# Patient Record
Sex: MALE | Race: BLACK OR AFRICAN AMERICAN | NOT HISPANIC OR LATINO | URBAN - METROPOLITAN AREA
[De-identification: names, ages, dates, MRNs, and addresses within clinical notes are randomized per-mention and may not be internally consistent; named-entity substitution may affect disease eponyms.]

---

## 2021-08-03 ENCOUNTER — NEW REFERRAL (OUTPATIENT)
Dept: URBAN - METROPOLITAN AREA CLINIC 14 | Facility: CLINIC | Age: 61
End: 2021-08-03

## 2021-08-03 DIAGNOSIS — H25.13: ICD-10-CM

## 2021-08-03 DIAGNOSIS — H35.51: ICD-10-CM

## 2021-08-03 DIAGNOSIS — H43.812: ICD-10-CM

## 2021-08-03 DIAGNOSIS — H43.391: ICD-10-CM

## 2021-08-03 DIAGNOSIS — H35.413: ICD-10-CM

## 2021-08-03 PROCEDURE — 92134 CPTRZ OPH DX IMG PST SGM RTA: CPT

## 2021-08-03 PROCEDURE — 92201 OPSCPY EXTND RTA DRAW UNI/BI: CPT

## 2021-08-03 PROCEDURE — 99244 OFF/OP CNSLTJ NEW/EST MOD 40: CPT

## 2021-08-03 ASSESSMENT — TONOMETRY
OS_IOP_MMHG: 11
OD_IOP_MMHG: 12

## 2021-08-03 ASSESSMENT — VISUAL ACUITY
OD_CC: 20/20-1
OS_CC: 20/20-1

## 2021-09-14 ENCOUNTER — FOLLOW UP (OUTPATIENT)
Dept: URBAN - METROPOLITAN AREA CLINIC 14 | Facility: CLINIC | Age: 61
End: 2021-09-14

## 2021-09-14 DIAGNOSIS — H35.413: ICD-10-CM

## 2021-09-14 DIAGNOSIS — H43.391: ICD-10-CM

## 2021-09-14 DIAGNOSIS — H35.51: ICD-10-CM

## 2021-09-14 DIAGNOSIS — H43.812: ICD-10-CM

## 2021-09-14 PROCEDURE — 92014 COMPRE OPH EXAM EST PT 1/>: CPT

## 2021-09-14 PROCEDURE — 92134 CPTRZ OPH DX IMG PST SGM RTA: CPT

## 2021-09-14 PROCEDURE — 92201 OPSCPY EXTND RTA DRAW UNI/BI: CPT

## 2021-09-14 ASSESSMENT — TONOMETRY
OS_IOP_MMHG: 14
OD_IOP_MMHG: 14

## 2021-09-14 ASSESSMENT — VISUAL ACUITY
OD_CC: 20/25
OS_PH: 20/20
OD_PH: 20/20
OS_CC: 20/25

## 2024-02-02 ENCOUNTER — APPOINTMENT (EMERGENCY)
Dept: RADIOLOGY | Facility: HOSPITAL | Age: 64
End: 2024-02-02
Payer: COMMERCIAL

## 2024-02-02 ENCOUNTER — HOSPITAL ENCOUNTER (EMERGENCY)
Facility: HOSPITAL | Age: 64
Discharge: HOME | End: 2024-02-02
Attending: EMERGENCY MEDICINE
Payer: COMMERCIAL

## 2024-02-02 VITALS
OXYGEN SATURATION: 100 % | BODY MASS INDEX: 25.03 KG/M2 | DIASTOLIC BLOOD PRESSURE: 60 MMHG | TEMPERATURE: 98 F | HEART RATE: 70 BPM | RESPIRATION RATE: 18 BRPM | WEIGHT: 169 LBS | HEIGHT: 69 IN | SYSTOLIC BLOOD PRESSURE: 129 MMHG

## 2024-02-02 DIAGNOSIS — M54.41 ACUTE BILATERAL LOW BACK PAIN WITH BILATERAL SCIATICA: Primary | ICD-10-CM

## 2024-02-02 DIAGNOSIS — M54.42 ACUTE BILATERAL LOW BACK PAIN WITH BILATERAL SCIATICA: Primary | ICD-10-CM

## 2024-02-02 PROCEDURE — 96372 THER/PROPH/DIAG INJ SC/IM: CPT

## 2024-02-02 PROCEDURE — 99283 EMERGENCY DEPT VISIT LOW MDM: CPT

## 2024-02-02 PROCEDURE — 99284 EMERGENCY DEPT VISIT MOD MDM: CPT | Performed by: EMERGENCY MEDICINE

## 2024-02-02 PROCEDURE — 72100 X-RAY EXAM L-S SPINE 2/3 VWS: CPT

## 2024-02-02 RX ORDER — LIDOCAINE 50 MG/G
1 PATCH TOPICAL DAILY
Qty: 30 PATCH | Refills: 0 | Status: SHIPPED | OUTPATIENT
Start: 2024-02-02

## 2024-02-02 RX ORDER — CYCLOBENZAPRINE HCL 10 MG
10 TABLET ORAL 3 TIMES DAILY PRN
Qty: 15 TABLET | Refills: 0 | Status: SHIPPED | OUTPATIENT
Start: 2024-02-02

## 2024-02-02 RX ORDER — KETOROLAC TROMETHAMINE 30 MG/ML
15 INJECTION, SOLUTION INTRAMUSCULAR; INTRAVENOUS ONCE
Status: COMPLETED | OUTPATIENT
Start: 2024-02-02 | End: 2024-02-02

## 2024-02-02 RX ORDER — PREDNISONE 20 MG/1
40 TABLET ORAL DAILY
Qty: 10 TABLET | Refills: 0 | Status: SHIPPED | OUTPATIENT
Start: 2024-02-02 | End: 2024-02-07

## 2024-02-02 RX ORDER — NAPROXEN 500 MG/1
500 TABLET ORAL 2 TIMES DAILY WITH MEALS
Qty: 14 TABLET | Refills: 0 | Status: SHIPPED | OUTPATIENT
Start: 2024-02-02 | End: 2024-02-09

## 2024-02-02 RX ORDER — CYCLOBENZAPRINE HCL 10 MG
10 TABLET ORAL ONCE
Status: COMPLETED | OUTPATIENT
Start: 2024-02-02 | End: 2024-02-02

## 2024-02-02 RX ORDER — PREDNISONE 20 MG/1
60 TABLET ORAL ONCE
Status: COMPLETED | OUTPATIENT
Start: 2024-02-02 | End: 2024-02-02

## 2024-02-02 RX ORDER — LIDOCAINE 50 MG/G
1 PATCH TOPICAL ONCE
Status: DISCONTINUED | OUTPATIENT
Start: 2024-02-02 | End: 2024-02-02 | Stop reason: HOSPADM

## 2024-02-02 RX ADMIN — PREDNISONE 60 MG: 20 TABLET ORAL at 09:29

## 2024-02-02 RX ADMIN — LIDOCAINE 5% 1 PATCH: 700 PATCH TOPICAL at 09:34

## 2024-02-02 RX ADMIN — CYCLOBENZAPRINE HYDROCHLORIDE 10 MG: 10 TABLET, FILM COATED ORAL at 09:29

## 2024-02-02 RX ADMIN — KETOROLAC TROMETHAMINE 15 MG: 30 INJECTION, SOLUTION INTRAMUSCULAR; INTRAVENOUS at 09:31

## 2024-02-02 NOTE — ED PROVIDER NOTES
History  Chief Complaint   Patient presents with    Back Pain     Pt reports pain to lower back with pain radiating down both legs for 3-4 weeks and getting worse.       Patient presents for evaluation of lower back pain radiating down the back of both legs for the last 3 to 4 weeks.  Does not recall any injury or trauma.  States he sits a lot for work.  Denies any bowel or bladder dysfunction.      History provided by:  Patient   used: No    Back Pain  Associated symptoms: no numbness and no weakness        None       History reviewed. No pertinent past medical history.    History reviewed. No pertinent surgical history.    History reviewed. No pertinent family history.  I have reviewed and agree with the history as documented.    E-Cigarette/Vaping    E-Cigarette Use Never User      E-Cigarette/Vaping Substances     Social History     Tobacco Use    Smoking status: Never    Smokeless tobacco: Never   Vaping Use    Vaping status: Never Used   Substance Use Topics    Alcohol use: Never    Drug use: Never       Review of Systems   Musculoskeletal:  Positive for back pain.   Neurological:  Negative for weakness and numbness.   All other systems reviewed and are negative.      Physical Exam  Physical Exam  Vitals and nursing note reviewed.   Constitutional:       General: He is not in acute distress.  Cardiovascular:      Rate and Rhythm: Normal rate.      Pulses: Normal pulses.   Pulmonary:      Effort: Pulmonary effort is normal. No respiratory distress.   Abdominal:      Tenderness: There is no abdominal tenderness.   Musculoskeletal:         General: No deformity. Normal range of motion.   Skin:     Capillary Refill: Capillary refill takes less than 2 seconds.      Findings: No rash.   Neurological:      General: No focal deficit present.      Mental Status: He is alert and oriented to person, place, and time.      Sensory: No sensory deficit.      Motor: No weakness.      Gait: Gait normal.          Vital Signs  ED Triage Vitals   Temperature Pulse Respirations Blood Pressure SpO2   02/02/24 0853 02/02/24 0853 02/02/24 0853 02/02/24 0853 02/02/24 0853   98 °F (36.7 °C) 70 18 129/60 100 %      Temp Source Heart Rate Source Patient Position - Orthostatic VS BP Location FiO2 (%)   02/02/24 0853 02/02/24 0853 02/02/24 0853 02/02/24 0853 --   Tympanic Monitor Sitting Left arm       Pain Score       02/02/24 0845       8           Vitals:    02/02/24 0853   BP: 129/60   Pulse: 70   Patient Position - Orthostatic VS: Sitting         Visual Acuity      ED Medications  Medications   ketorolac (TORADOL) injection 15 mg (15 mg Intramuscular Given 2/2/24 0931)   predniSONE tablet 60 mg (60 mg Oral Given 2/2/24 0929)   cyclobenzaprine (FLEXERIL) tablet 10 mg (10 mg Oral Given 2/2/24 0929)       Diagnostic Studies  Results Reviewed       None                   XR lumbar spine 2 or 3 views   Final Result by Durga Mera MD (02/02 1028)      No acute osseous abnormality.      Degenerative changes as described.         Workstation performed: [id number]                    Procedures  Procedures         ED Course                               SBIRT 20yo+      Flowsheet Row Most Recent Value   Initial Alcohol Screen: US AUDIT-C     1. How often do you have a drink containing alcohol? 0 Filed at: 02/02/2024 0849   Audit-C Score 0 Filed at: 02/02/2024 0849                      Medical Decision Making  Pulse ox 100% on room air indicating adequate oxygenation.  Xray L-Spine: No fracture or dislocation as read by me    Amount and/or Complexity of Data Reviewed  Radiology: ordered and independent interpretation performed.    Risk  Prescription drug management.             Disposition  Final diagnoses:   Acute bilateral low back pain with bilateral sciatica     Time reflects when diagnosis was documented in both MDM as applicable and the Disposition within this note       Time User Action Codes Description Comment     2/2/2024  9:27 AM Doron Dias Add [M54.42,  M54.41] Acute bilateral low back pain with bilateral sciatica           ED Disposition       ED Disposition   Discharge    Condition   Stable    Date/Time   Fri Feb 2, 2024 0926    Comment   Calin Schultz discharge to home/self care.                   Follow-up Information       Follow up With Specialties Details Why Contact Info Additional Information    [facility] Emergency Department Emergency Medicine  If symptoms worsen [address] [facility] [address]  [phone] [facility], [address] [place], [place], [address]            Discharge Medication List as of 2/2/2024  9:30 AM        START taking these medications    Details   cyclobenzaprine (FLEXERIL) 10 mg tablet Take 1 tablet (10 mg total) by mouth 3 (three) times a day as needed for muscle spasms for up to 15 doses, Starting Fri 2/2/2024, Normal      lidocaine (Lidoderm) 5 % Apply 1 patch topically over 12 hours daily Remove & Discard patch within 12 hours or as directed by MD, Starting Fri 2/2/2024, Normal      naproxen (NAPROSYN) 500 mg tablet Take 1 tablet (500 mg total) by mouth 2 (two) times a day with meals for 7 days, Starting Fri 2/2/2024, Until Fri 2/9/2024, Normal      predniSONE 20 mg tablet Take 2 tablets (40 mg total) by mouth daily for 5 days, Starting Fri 2/2/2024, Until Wed 2/7/2024, Normal                 PDMP Review       None            ED Provider  Electronically Signed by             Doron Dias DO  02/02/24 1473

## 2024-02-05 ENCOUNTER — TELEPHONE (OUTPATIENT)
Dept: PHYSICAL THERAPY | Facility: OTHER | Age: 64
End: 2024-02-05

## 2024-02-05 ENCOUNTER — NURSE TRIAGE (OUTPATIENT)
Dept: PHYSICAL THERAPY | Facility: OTHER | Age: 64
End: 2024-02-05

## 2024-02-05 DIAGNOSIS — M54.41 ACUTE BILATERAL LOW BACK PAIN WITH BILATERAL SCIATICA: Primary | ICD-10-CM

## 2024-02-05 DIAGNOSIS — M54.42 ACUTE BILATERAL LOW BACK PAIN WITH BILATERAL SCIATICA: Primary | ICD-10-CM

## 2024-02-05 NOTE — TELEPHONE ENCOUNTER
"Additional Information   Negative: Is this related to a work injury?   Negative: Is this related to an MVA?   Negative: Are you currently recieving homecare services?    Background - Initial Assessment  Clinical complaint: ED visit on 02/02 due to \"mild\" Lower back pain and Sciatica pain. Hx of back pain 20-30 years ago. Patient states the sciatica pain started 3-4 weeks ago. It radiates from buttocks down to both legs, thighs, calf and feet, mainly on the right side. Numbness in right calf and foot, tingling in feet mostly right foot. Not seeing a Dr for this pain. NKI. Patient states pain is intermittent but persistent \"every day\". Worse when tries to get up from a sitting position, and starts walking. Patient described pain as shooting, throbbing, stabbing and spasm.  Date of onset:  3-4 weeks ago  Frequency of pain: intermittent and persistent.  Quality of pain: numb, shooting, stabbing, throbbing, and tingling.    Protocols used: [facility] Protocol    "

## 2024-02-05 NOTE — TELEPHONE ENCOUNTER
Call placed to the patient per [facility] referral.    V/M left for patient to call back. Phone number and hours of business provided.    This is the 1st attempt to reach the patient. Will defer referral per protocol.

## 2024-02-05 NOTE — TELEPHONE ENCOUNTER
Additional Information   Negative: Has the patient experienced major trauma? (fall from height, high speed collision, direct blow to spine) and is also experiencing nausea, light-headedness, or loss of consciousness?   Negative: Has the patient had unexplained weight loss?   Negative: Does the patient have a fever?   Negative: Is the patient experiencing urine retention?   Negative: Is the patient experiencing acute drop foot or paralysis?   Negative: Is the patient experiencing blood in sputum?   Negative: Is this a chronic condition?    Protocols used: [facility] Protocol    This RN did review in detail the [facility] and what we can provide for their back pain.  Patient is agreeable to being triaged by this RN and would like to proceed with Physical Therapy.    Referral was placed for Physical Therapy at the [facility]. Patients information was sent to the  to make evaluation appointment. Patient made aware that the PT office  will be calling to schedule the appointment.  Patient was provided with the phone number to the PT office.    No further questions and/or concerns were voiced by the patient at this time. Patient states understanding of the referral that was placed.    Referral Closed.

## 2024-02-08 ENCOUNTER — EVALUATION (OUTPATIENT)
Dept: PHYSICAL THERAPY | Facility: CLINIC | Age: 64
End: 2024-02-08
Payer: COMMERCIAL

## 2024-02-08 DIAGNOSIS — M54.42 ACUTE BILATERAL LOW BACK PAIN WITH BILATERAL SCIATICA: ICD-10-CM

## 2024-02-08 DIAGNOSIS — M54.41 ACUTE BILATERAL LOW BACK PAIN WITH BILATERAL SCIATICA: ICD-10-CM

## 2024-02-08 PROCEDURE — 97161 PT EVAL LOW COMPLEX 20 MIN: CPT | Performed by: PHYSICAL THERAPIST

## 2024-02-08 PROCEDURE — 97112 NEUROMUSCULAR REEDUCATION: CPT | Performed by: PHYSICAL THERAPIST

## 2024-02-08 NOTE — LETTER
2024      No Recipients    Patient: Calin Schultz  YOB: 1960   Date of Visit: 2024      Dear Dr. Leo:    One of your patients, Calin Schultz, was referred to me by the [facility].  Please see the evaluation summary attached below. Kindly provide your signature on the plan of care.    If you have any questions or concerns, please don't hesitate to call.      Sincerely,    Niru Ireland PT      Primary Care Provider:      I certify that I have read the below Plan of Care and certify the need for these services furnished under this plan of treatment while under my care.                    Brandyn Leo MD  [address]  [address]  [facility] [address]  Via Fax: [phone]           PT Evaluation     Today's date: 2024  Patient name: Calin Schultz  : 1960  MRN: [id number]  Referring provider: Niru Ireland PT  Dx:   Encounter Diagnosis     ICD-10-CM    1. Acute bilateral low back pain with bilateral sciatica  M54.42 Ambulatory referral to PT spine    M54.41                      Assessment  Assessment details: 2024: Pt presents with constant buttock/posterior proximal thigh pain and intermittent R posterior LE symptoms to his ankle. His signs and symptoms present for more than 16 days, are below the knee which are not increasing ordecreasing and are consistent with derangement which would benefit from patient education and directional specific mechanical correction. Symptoms respond with significant reduction and centralization upon mechanical assessment to determine directional preference of lumbar extension. Pt will benefit from skilled physical therapy 1-2X/week for 4-8 weeks to address deficits in range of motion, strength and function and return to PLOF by reaching short and long term goals. Pt was educated in spine anatomy, concept of peripheralization vs centralization, importance of HEP frequency and avoidance of  positions that peripheralize his pain.    Impairments: abnormal or restricted ROM, activity intolerance, lacks appropriate home exercise program, pain with function, poor posture  and poor body mechanics  Understanding of Dx/Px/POC: good   Prognosis: good    Goals  Short Tem Goals to be met in 4 weeks (target date 3/7/2024)  1. Pt to be independent w/ HEP  2. Reduce c/o pain to 0-5/10 with activity and prolonged positions; pain to become intermittent vs current constant.  3. Restore lumbar AROM to WFL.  4. Pt's pain to localize to L posterior thigh, buttock, and not radiate below that or spread to R LE.    Long Term Goal to be met in 8 weeks (target date 4/4/2024)  1. Pt to achieve full symptom prevention/resolution via HEP, reporting no remaining pain.  2. Pt to resume PLOF regarding ADL's and IADL's, achieving FOTO score of 69 or better.  3. Pt to achieve full lumbar ROM and LE hamstring flexibility w/o pain.  4. Pt to tolerate prolonged driving w/o c/o.      Plan  Planned modality interventions: traction, unattended electrical stimulation and thermotherapy: hydrocollator packs  Planned therapy interventions: joint mobilization, manual therapy, abdominal trunk stabilization, patient education, postural training, stretching, transfer training, home exercise program and strengthening  Frequency: 1-2x/week.  Plan of Care beginning date: 2/8/2024  Plan of Care expiration date: 4/4/2024  Treatment plan discussed with: patient        Subjective Evaluation    History of Present Illness  Mechanism of injury: Subjective 2/8/2024: pt woke one day 4 weeks ago with pain in both legs (posterior thigh to ankle) and numbness/tingling B/L calves. Initially pain was equal R vs L but recently the left is better than the right.   He did drive more than usual around the time of symptom onset.  Pt notes his legs get tired quickly now w/ driving, but also reports symptoms are worse w/ standing. Symptoms are constant in his gluts.   Pt  was prescribed prednisone at the ED which he is finishing today w/ some benefit.  Pt works as an . PMH significant for prior LBP, but not radicular symptoms. Pt has maintained prior HEP, describing bird dogs and wall squats (no mechanical correction/repeated lumbar movements)  Patient Goals  Patient goals for therapy: decreased pain, increased strength, return to sport/leisure activities and independence with ADLs/IADLs    Pain  At best pain rating: 3  At worst pain ratin  Quality: tingling; spasmotic, gnawing.  Progression: no change    Social Support    Employment status: working    Diagnostic Tests  X-ray: normal  Treatments  Previous treatment: medication  Current treatment: physical therapy        Objective    POSTURE:  2024 standing posture demonstrates normal lumbar lordosis; there is no lateral shift   Posture correction in stting reduces sympotoms; slumping increases c/o.    SPECIAL TESTS     2024   SLR supine:   (+ R/- L)  Crossed SLR:   (- R/+ L)  Prone instability test:  (+) kashif colin/guarded  Prone hip IR ROM:  (-R/-L)  Aberrant motion/X[place]'s: (+)  Supine to sit test:  (-)  Elizabeth test prone:  (-)  Slump test:   (+R/-L)  Femoral NTT:   (NT)      DERMATOMAL TESTIN/8/2024  L2 anterior thigh:  WNL B/L  L3 medial knee:  WNL B/L  L4 medial maleolus:  WNL B/L  L5 1st web space:  WNL B/L  S1 lateral/sole foot:  WNL B/L  S2 poster calf:  WNL B/L    MYOTOMAL TESTIN2024     Right  Left  L2-3 Hip flexion:  4+/5  4+/5  L3-4 Knee extension:  5/5  5/5  L5 Great toe exten:  5/5  5/5  L4 Heel walk/DF:  5/5  5/5  S1 toe walk/PF/ever:  5/5  5/5  S2 knee flex:   5/5  5/5    REFLEXES: 0= none; 1+ = slight; 2+ = brisk/normal; 3+= very brisk; 4+= clonus     2024  L3-4 Quadriceps:  0  L5-S1 Achilles:  0    SPINAL/PIAVM ASSESSMENT/PALPATION:   2024: Lumbar P to A segmental mobility kashif limited - guarded     LUMBAR AROM:   Flexion   Mod/kashif  "colin*  Extension  kashif colin* B/L glut fold  R sideglide  min colin  L sideglide  kashif colin* L LE    MECHANICAL ASSESSMENT:   2/8/2024: pre-test findings include 8/10 pain R LE post thigh/calf and L LE post thigh  ANEL 1 min x3 = cent to mid thigh/B  Repeated extension in lying (REIL) 3x5 = Centralizing to R glut fold/lat hip/B 4/10 (no L LE pain)      FUNCTION:  2/8/2024: Pt is limited with tolerance to driving w/ increased pain and LE \"weakness\"   Pain w/ lumbar AROM   Limited tolerance to prolonged standing and walking   Limited tolerance to bending/lifting   Inc pain w/ slumped sitting    FLEXIBILITY:  2/8/2024: Hamstring flexibility mod colin B/L 60 deg         Quad/hip flexor flexibitlity NT         Pirif flexibility kashif colin B/L          Precautions: History reviewed. No pertinent past medical history.   Access Code: [email or web address]  URL: [email or web address]  Date: 02/08/2024  Prepared by: Niru Ireland    Exercises  - Static Prone on Elbows  - 5 x daily - 3 reps - 1 minute hold  - Prone Press Up  - 5 x daily - 3 sets - 5 reps    SOC: 2/8/2024  FOTO: 2/8/2024  POC EXP:  DAILY TREATMENT LOG  date 2/8/2024       Visit #/auth 1       manual                        Neuro Re-Ed        ANEL  1'x3       REIL 3x5 (centralizing/B)       Sup sciatic NG        Sup fig 4 stretch                                Ther Ex                bridges        Fwd step ups w/ alt knee hike                                                Ther Activity                        Gait Training                        Modalities                             "

## 2024-02-08 NOTE — PROGRESS NOTES
PT Evaluation     Today's date: 2024  Patient name: Calin Schultz  : 1960  MRN: [id number]  Referring provider: Niru Ireland PT  Dx:   Encounter Diagnosis     ICD-10-CM    1. Acute bilateral low back pain with bilateral sciatica  M54.42 Ambulatory referral to PT spine    M54.41                      Assessment  Assessment details: 2024: Pt presents with constant buttock/posterior proximal thigh pain and intermittent R posterior LE symptoms to his ankle. His signs and symptoms present for more than 16 days, are below the knee which are not increasing ordecreasing and are consistent with derangement which would benefit from patient education and directional specific mechanical correction. Symptoms respond with significant reduction and centralization upon mechanical assessment to determine directional preference of lumbar extension. Pt will benefit from skilled physical therapy 1-2X/week for 4-8 weeks to address deficits in range of motion, strength and function and return to PLOF by reaching short and long term goals. Pt was educated in spine anatomy, concept of peripheralization vs centralization, importance of HEP frequency and avoidance of positions that peripheralize his pain.    Impairments: abnormal or restricted ROM, activity intolerance, lacks appropriate home exercise program, pain with function, poor posture  and poor body mechanics  Understanding of Dx/Px/POC: good   Prognosis: good    Goals  Short Tem Goals to be met in 4 weeks (target date 3/7/2024)  1. Pt to be independent w/ HEP  2. Reduce c/o pain to 0-5/10 with activity and prolonged positions; pain to become intermittent vs current constant.  3. Restore lumbar AROM to WFL.  4. Pt's pain to localize to L posterior thigh, buttock, and not radiate below that or spread to R LE.    Long Term Goal to be met in 8 weeks (target date 2024)  1. Pt to achieve full symptom prevention/resolution via HEP, reporting no remaining pain.  2.  Pt to resume PLOF regarding ADL's and IADL's, achieving FOTO score of 69 or better.  3. Pt to achieve full lumbar ROM and LE hamstring flexibility w/o pain.  4. Pt to tolerate prolonged driving w/o c/o.      Plan  Planned modality interventions: traction, unattended electrical stimulation and thermotherapy: hydrocollator packs  Planned therapy interventions: joint mobilization, manual therapy, abdominal trunk stabilization, patient education, postural training, stretching, transfer training, home exercise program and strengthening  Frequency: 1-2x/week.  Plan of Care beginning date: 2024  Plan of Care expiration date: 2024  Treatment plan discussed with: patient        Subjective Evaluation    History of Present Illness  Mechanism of injury: Subjective 2024: pt woke one day 4 weeks ago with pain in both legs (posterior thigh to ankle) and numbness/tingling B/L calves. Initially pain was equal R vs L but recently the left is better than the right.   He did drive more than usual around the time of symptom onset.  Pt notes his legs get tired quickly now w/ driving, but also reports symptoms are worse w/ standing. Symptoms are constant in his gluts.   Pt was prescribed prednisone at the ED which he is finishing today w/ some benefit.  Pt works as an . PMH significant for prior LBP, but not radicular symptoms. Pt has maintained prior HEP, describing bird dogs and wall squats (no mechanical correction/repeated lumbar movements)  Patient Goals  Patient goals for therapy: decreased pain, increased strength, return to sport/leisure activities and independence with ADLs/IADLs    Pain  At best pain rating: 3  At worst pain ratin  Quality: tingling; spasmotic, gnawing.  Progression: no change    Social Support    Employment status: working    Diagnostic Tests  X-ray: normal  Treatments  Previous treatment: medication  Current treatment: physical therapy        Objective    POSTURE:  2024 standing  "posture demonstrates normal lumbar lordosis; there is no lateral shift   Posture correction in stting reduces sympotoms; slumping increases c/o.    SPECIAL TESTS     2024   SLR supine:   (+ R/- L)  Crossed SLR:   (- R/+ L)  Prone instability test:  (+) kashif colin/guarded  Prone hip IR ROM:  (-R/-L)  Aberrant motion/[place]'s: (+)  Supine to sit test:  (-)  [place] test prone:  (-)  Slump test:   (+R/-L)  Femoral NTT:   (NT)      DERMATOMAL TESTIN/8/2024  L2 anterior thigh:  WNL B/L  L3 medial knee:  WNL B/L  L4 medial maleolus:  WNL B/L  L5 1st web space:  WNL B/L  S1 lateral/sole foot:  WNL B/L  S2 poster calf:  WNL B/L    MYOTOMAL TESTIN2024     Right  Left  L2-3 Hip flexion:  4+/5  4+/5  L3-4 Knee extension:  5/5  5/5  L5 Great toe exten:  5/5  5/5  L4 Heel walk/DF:  5/5  5/5  S1 toe walk/PF/ever:  5/5  5/5  S2 knee flex:   5/5  5/5    REFLEXES: 0= none; 1+ = slight; 2+ = brisk/normal; 3+= very brisk; 4+= clonus     2024  L3-4 Quadriceps:  0  L5-S1 Achilles:  0    SPINAL/PIAVM ASSESSMENT/PALPATION:   2024: Lumbar P to A segmental mobility kashif limited - guarded     LUMBAR AROM:   Flexion   Mod/kashif colin*  Extension  kashif colin* B/L glut fold  R sideglide  min colin  L sideglide  kashif colin* L LE    MECHANICAL ASSESSMENT:   2024: pre-test findings include 810 pain R LE post thigh/calf and L LE post thigh  ANEL 1 min x3 = cent to mid thigh/B  Repeated extension in lying (REIL) 3x5 = Centralizing to R glut fold/lat hip/B 4/10 (no L LE pain)      FUNCTION:  2024: Pt is limited with tolerance to driving w/ increased pain and LE \"weakness\"   Pain w/ lumbar AROM   Limited tolerance to prolonged standing and walking   Limited tolerance to bending/lifting   Inc pain w/ slumped sitting    FLEXIBILITY:  2024: Hamstring flexibility mod colin B/L 60 deg         Quad/hip flexor flexibitlity NT         Pirif flexibility kashif colin B/L          Precautions: History reviewed. No pertinent " past medical history.   Access Code: [email or web address]  URL: [email or web address]  Date: 02/08/2024  Prepared by: Niru Ireland    Exercises  - Static Prone on Elbows  - 5 x daily - 3 reps - 1 minute hold  - Prone Press Up  - 5 x daily - 3 sets - 5 reps    SOC: 2/8/2024  FOTO: 2/8/2024  POC EXP:  DAILY TREATMENT LOG  date 2/8/2024       Visit #/auth 1       manual                        Neuro Re-Ed        ANEL  1'x3       REIL 3x5 (centralizing/B)       Sup sciatic NG        Sup fig 4 stretch                                Ther Ex                bridges        Fwd step ups w/ alt knee hike                                                Ther Activity                        Gait Training                        Modalities

## 2024-02-12 ENCOUNTER — OFFICE VISIT (OUTPATIENT)
Dept: PHYSICAL THERAPY | Facility: CLINIC | Age: 64
End: 2024-02-12
Payer: COMMERCIAL

## 2024-02-12 DIAGNOSIS — M54.41 ACUTE BILATERAL LOW BACK PAIN WITH BILATERAL SCIATICA: Primary | ICD-10-CM

## 2024-02-12 DIAGNOSIS — M54.42 ACUTE BILATERAL LOW BACK PAIN WITH BILATERAL SCIATICA: Primary | ICD-10-CM

## 2024-02-12 PROCEDURE — 97112 NEUROMUSCULAR REEDUCATION: CPT | Performed by: PHYSICAL THERAPIST

## 2024-02-12 NOTE — PROGRESS NOTES
"Daily Note     Today's date: 2024  Patient name: Calin Schultz  : 1960  MRN: [id number]  Referring provider: Niru Ireland, PT  Dx:   Encounter Diagnosis     ICD-10-CM    1. Acute bilateral low back pain with bilateral sciatica  M54.42     M54.41                      Subjective: Pt reports 5/10 B/L glut fold and posterior thigh pain and R calf pain upon arrival. He had some \"spasms\" in R lateral lumbar area over the weekend.He did a lot of driving over the weekend.       Objective: See treatment diary below; pt arrives 15 min late, was accommodated.   ANEL 1'x2; then REIL 1x10 =     Assessment: Tolerated treatment well and continues to respond well to extension principle, but is seeing limited progress due to continued longer amounts of time spent in sitting. New HUSSEIN stretch and MH/estim reduce c/o muscle spasm . Patient would benefit from continued PT and updates to HEP made today.  ROM lumbar extension closer to end range w/ EIL from qped vs press ups.      Plan: Progress treatment as tolerated.         SOC: 2024  FOTO: 2024  POC EXP:  DAILY TREATMENT LOG  date 2024      Visit #/auth 1 2      manual                        Neuro Re-Ed  40'      ANEL  1'x3 1'x2      REIL 3x5 (centralizing/B) 1x10      EIL from qped  1x10; x2      Sup sciatic NG  1x10 R/L      Sup fig 4 stretch  20\"x3 R/L              Pt educ Periph vs centraliz; disc mechanics Reviewed disc mechanics; importance of avoidance of prolonged sitting; may need more reps of EIL if he sat a lot; try MH in prone      HeP Issued & reviewed Update & review      Ther Ex                bridges        Fwd step ups w/ alt knee hike                                                Ther Activity                        Gait Training                        Modalities        MH/estim prone  10' skin intact pre/post              Access Code: [email or web address]  URL: [email or web address]  Date: 2024  Prepared by: Niru " Shu    Exercises  - Static Prone on Elbows  - 5 x daily - 3 reps - 1 minute hold  - Quadruped Hip Drops  - 5 x daily - 10 reps  - Supine Hip External Rotation AAROM  - 2 x daily - 3 reps - 20 hold  - Supine Sciatic Nerve Glide  - 2 x daily - 10 reps

## 2024-02-15 ENCOUNTER — OFFICE VISIT (OUTPATIENT)
Dept: PHYSICAL THERAPY | Facility: CLINIC | Age: 64
End: 2024-02-15
Payer: COMMERCIAL

## 2024-02-15 DIAGNOSIS — M54.41 ACUTE BILATERAL LOW BACK PAIN WITH BILATERAL SCIATICA: Primary | ICD-10-CM

## 2024-02-15 DIAGNOSIS — M54.42 ACUTE BILATERAL LOW BACK PAIN WITH BILATERAL SCIATICA: Primary | ICD-10-CM

## 2024-02-15 PROCEDURE — 97112 NEUROMUSCULAR REEDUCATION: CPT | Performed by: PHYSICAL THERAPIST

## 2024-02-15 PROCEDURE — 97110 THERAPEUTIC EXERCISES: CPT | Performed by: PHYSICAL THERAPIST

## 2024-02-15 NOTE — PROGRESS NOTES
"Daily Note     Today's date: 2/15/2024  Patient name: Calin Schultz  : 1960  MRN: [id number]  Referring provider: Brandyn Leo MD  Dx:   Encounter Diagnosis     ICD-10-CM    1. Acute bilateral low back pain with bilateral sciatica  M54.42     M54.41                      Subjective: Pt states he is surprised \"I feel a lot better\" at the end of his session today w/ all the new exercises, \"much looser\".      Objective: See treatment diary below; updated HEP. Initial stiffness noted w/ cat/cow and EIL from qped, improves w/ increased repetitions.      Assessment: Tolerated treatment well and is able to progress functional activities and LE stretching w/ good tolerance today . Patient would benefit from continued PT. Lumbar extension ROM near full by session end.      Plan: Progress treatment as tolerated.       SOC: 2024  FOTO: 2024  POC EXP:  DAILY TREATMENT LOG  date 2024 2024 2/15/2024     Visit #/auth 1 2 3     manual                        Neuro Re-Ed  40' 20'     ANEL  1'x3 1'x2 W/ B/L knee flexion 2x10     REIL 3x5 (centralizing/B) 1x10      EIL from qped  1x10; x2 2x5     Sup sciatic NG  1x10 R/L 1x10 R/L     Sup fig 4 stretch  20\"x3 R/L 20\"x3 R/L     Sup pirif stretch w/ SOS pulling bottom knee to chest   20\"x3 R/L     Pt educ Periph vs centraliz; disc mechanics Reviewed disc mechanics; importance of avoidance of prolonged sitting; may need more reps of EIL if he sat a lot; try MH in prone      HeP Issued & reviewed Update & review UPDATE & REVIEW     Ther Ex   25'     Knee hugs; soldier kicks   15'x4 ea     bridges   1x10     Fwd step ups w/ alt knee hike   8\" step 1x10 R/L     Stand open books   1x10 R/L     STS w/ B/L OH press   7# DB's 2x10     Rocker board PD ROM   2X10     SL dead lift w/ rail   7# DB 1x10 R/L     Cat/cow   1x10     Ther Activity                        Gait Training                        Modalities        MH/estim prone  10' skin intact pre/post            "   HEP:  Access Code: [email or web address]  URL: [email or web address]  Date: 02/15/2024  Prepared by: Niru Ireland    Exercises  - Static Prone on Elbows  - 5 x daily - 3 reps - 1 minute hold  - Prone on elbows with knee flexion  - 1 x daily - 7 x weekly - 2 sets - 10 reps  - Quadruped Hip Drops  - 5 x daily - 10 reps  - Supine Bridge  - 1 x daily - 10 reps  - Supine Hip External Rotation AAROM  - 2 x daily - 3 reps - 20 hold  - Supine Sciatic Nerve Glide  - 2 x daily - 10 reps  - Supine Piriformis Stretch  - 1 x daily - 7 x weekly - 3 reps - 20 hold  - Standing Thoracic Open Book at Wall  - 1 x daily - 7 x weekly - 10 reps    Access Code: [email or web address]  URL: [email or web address]  Date: 02/12/2024  Prepared by: Niru Shu    Exercises  - Static Prone on Elbows  - 5 x daily - 3 reps - 1 minute hold  - Quadruped Hip Drops  - 5 x daily - 10 reps  - Supine Hip External Rotation AAROM  - 2 x daily - 3 reps - 20 hold  - Supine Sciatic Nerve Glide  - 2 x daily - 10 reps

## 2024-02-22 ENCOUNTER — OFFICE VISIT (OUTPATIENT)
Dept: PHYSICAL THERAPY | Facility: CLINIC | Age: 64
End: 2024-02-22
Payer: COMMERCIAL

## 2024-02-22 DIAGNOSIS — M54.42 ACUTE BILATERAL LOW BACK PAIN WITH BILATERAL SCIATICA: Primary | ICD-10-CM

## 2024-02-22 DIAGNOSIS — M54.41 ACUTE BILATERAL LOW BACK PAIN WITH BILATERAL SCIATICA: Primary | ICD-10-CM

## 2024-02-22 PROCEDURE — 97112 NEUROMUSCULAR REEDUCATION: CPT | Performed by: PHYSICAL THERAPIST

## 2024-02-22 PROCEDURE — 97110 THERAPEUTIC EXERCISES: CPT | Performed by: PHYSICAL THERAPIST

## 2024-02-22 NOTE — PROGRESS NOTES
"Daily Note     Today's date: 2024  Patient name: Calin Schultz  : 1960  MRN: [id number]  Referring provider: Niru Ireland, PT  Dx:   Encounter Diagnosis     ICD-10-CM    1. 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 He did try his HEP a little but he was so inflamed he didn't tolerate it well.      Objective: See treatment diary below; Pt arrives 15 minutes late, lost track of time.  HEP updated & reviewed.  Reviewed importance of HEP consistency and avoidance of prolonged flexion biased postures. Informed him it will require consistency over at least a week to fully resolve symptoms.  Pt was encouraged to schedule more appts, rather than wait until he's used his last session as our schedule has been filling up.      Assessment: Tolerated treatment well and continues to respond to extension well . Patient would benefit from continued PT and application of educational concepts.      Plan: Continue per plan of care.      SOC: 2024  FOTO: 2024  POC EXP:  DAILY TREATMENT LOG  date 2024 2024 2/15/2024 2024  FOTO    Visit #/auth 1 2 3 4    manual                        Neuro Re-Ed  40' 20' 20'    ANEL  1'x3 1'x2 W/ B/L knee flexion 2x10 W/ B/L knee flexion 1x15    REIL 3x5 (centralizing/B) 1x10      EIL from qped  1x10; x2 2x5 1x10    Sup sciatic NG  1x10 R/L 1x10 R/L 1x10 R/L    Sup fig 4 stretch  20\"x3 R/L 20\"x3 R/L 10\"x3 R/L w/ LTR     Sup pirif stretch w/ SOS pulling bottom knee to chest   20\"x3 R/L 20\"x3 R/L    Pt educ Periph vs centraliz; disc mechanics Reviewed disc mechanics; importance of avoidance of prolonged sitting; may need more reps of EIL if he sat a lot; try MH in prone  Reviewed importance of avoidance of sitting/flexed postures and consistency w/ HEP over several consec days    HeP Issued & reviewed " "Update & review UPDATE & REVIEW     Ther Ex   25' 15'    Knee hugs; soldier kicks   15'x4 ea 15'x4 ea    bridges   1x10 W/ hip abd blue 2x10    Fwd step ups w/ alt knee hike   8\" step 1x10 R/L 8\" step 2x10 R/L    Stand open books   1x10 R/L     STS w/ B/L OH press   7# DB's 2x10 7# DB's 2x10    Rocker board PD ROM   2X10     SL dead lift w/ rail   7# DB 1x10 R/L 7# DB 1x10 R/L    Cat/cow   1x10     Ther Activity                        Gait Training                        Modalities        MH/estim prone  10' skin intact pre/post              HEP:  Access Code: [email or web address]  URL: [email or web address]  Date: 02/22/2024  Prepared by: Niru Ireland    Exercises  - Prone on elbows with knee flexion  - 5 x daily - 2 sets - 10 reps  - Quadruped Hip Drops  - 5 x daily - 10 reps  - Bridge with Hip Abduction and Resistance  - 1 x daily - 2 sets - 10 reps  - Supine Piriformis Stretch  - 1 x daily - 3 reps - 20 hold  - Supine Gluteus Stretch  - 1 x daily - 3 reps - 20 hold  - Supine Sciatic Nerve Glide  - 1 x daily - 2 sets - 10 reps  - Standing Thoracic Open Book at Wall  - 1 x daily - 10 reps           "

## 2024-02-29 ENCOUNTER — EVALUATION (OUTPATIENT)
Dept: PHYSICAL THERAPY | Facility: CLINIC | Age: 64
End: 2024-02-29
Payer: COMMERCIAL

## 2024-02-29 DIAGNOSIS — M54.42 ACUTE BILATERAL LOW BACK PAIN WITH BILATERAL SCIATICA: Primary | ICD-10-CM

## 2024-02-29 DIAGNOSIS — M54.41 ACUTE BILATERAL LOW BACK PAIN WITH BILATERAL SCIATICA: Primary | ICD-10-CM

## 2024-02-29 PROCEDURE — 97110 THERAPEUTIC EXERCISES: CPT | Performed by: PHYSICAL THERAPIST

## 2024-02-29 PROCEDURE — 97112 NEUROMUSCULAR REEDUCATION: CPT | Performed by: PHYSICAL THERAPIST

## 2024-02-29 NOTE — PROGRESS NOTES
PT Re-Evaluation     Today's date: 2024  Patient name: Calin Schultz  : 1960  MRN: [id number]  Referring provider: Niru Ireland PT  Dx:   Encounter Diagnosis     ICD-10-CM    1. Acute bilateral low back pain with bilateral sciatica  M54.42     M54.41                      Assessment  Assessment details: 2024: Pt has attended 5 skilled PT sessions and reports 50% progress. Pain is now intermittent vs prior constant and remains more proximal vs radiating down his legs. He demonstrates improved LE flexibility, improved LE MMT, improved FOTO score, improved functional mobility and improved lumbar ROM. Pt does continue to spend more time than advised sitting/driving and also has attended a limited number of appts. He is benefiting from skilled PT.  He is electing to follow up /w PCP at this time. He understands as a comprehensive spine patient, I can only see him for up to 30 days from his initial evaluation as his POC sent to Dr Leo has not yet been returned/signed. Symptoms remain consistent lumbar derangement w/ directional preference of extension with underlying mobility and flexibility deficits. All benefiting from HEP updated today.    2024: Pt presents with constant buttock/posterior proximal thigh pain and intermittent R posterior LE symptoms to his ankle. His signs and symptoms present for more than 16 days, are below the knee which are not increasing ordecreasing and are consistent with derangement which would benefit from patient education and directional specific mechanical correction. Symptoms respond with significant reduction and centralization upon mechanical assessment to determine directional preference of lumbar extension. Pt will benefit from skilled physical therapy 1-2X/week for 4-8 weeks to address deficits in range of motion, strength and function and return to PLOF by reaching short and long term goals. Pt was educated in spine anatomy, concept of peripheralization  vs centralization, importance of HEP frequency and avoidance of positions that peripheralize his pain.    Impairments: abnormal or restricted ROM, activity intolerance, pain with function, poor posture  and poor body mechanics  Understanding of Dx/Px/POC: good   Prognosis: good    Goals  Short Tem Goals to be met in 4 weeks (target date 3/7/2024)  1. Pt to be independent w/ HEP - met preliminary  2. Reduce c/o pain to 0-5/10 with activity and prolonged positions; pain to become intermittent vs current constant.- partially met  3. Restore lumbar AROM to WFL. - improved/not met  4. Pt's pain to localize to L posterior thigh, buttock, and not radiate below that or spread to R LE. - met    Long Term Goal to be met in 8 weeks (target date 4/4/2024) - progressing toward  1. Pt to achieve full symptom prevention/resolution via HEP, reporting no remaining pain.  2. Pt to resume PLOF regarding ADL's and IADL's, achieving FOTO score of 69 or better.  3. Pt to achieve full lumbar ROM and LE hamstring flexibility w/o pain.  4. Pt to tolerate prolonged driving w/o c/o.      Plan  Planned modality interventions: traction, unattended electrical stimulation and thermotherapy: hydrocollator packs  Planned therapy interventions: joint mobilization, manual therapy, abdominal trunk stabilization, patient education, postural training, stretching, transfer training, home exercise program and strengthening  Frequency: 1-2x/week.  Plan of Care beginning date: 2/8/2024  Plan of Care expiration date: 4/4/2024  Treatment plan discussed with: patient      Subjective Evaluation    History of Present Illness  Mechanism of injury: Subjective   2/29/2024: pt reports 50% improvement thus far w/ PT. He notes mostly leg fatigue and B/L glut pain intermittently. He reports increased symptoms after prolonged sitting/driving or prolonged standing. Symptoms are now intermittent vs constant at eval. 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  2024: pt woke one day 4 weeks ago with pain in both legs (posterior thigh to ankle) and numbness/tingling B/L calves. Initially pain was equal R vs L but recently the left is better than the right.   He did drive more than usual around the time of symptom onset.  Pt notes his legs get tired quickly now w/ driving, but also reports symptoms are worse w/ standing. Symptoms are constant in his gluts.   Pt was prescribed prednisone at the ED which he is finishing today w/ some benefit.  Pt works as an . PMH significant for prior LBP, but not radicular symptoms. Pt has maintained prior HEP, describing bird dogs and wall squats (no mechanical correction/repeated lumbar movements)  Patient Goals  Patient goals for therapy: decreased pain, increased strength, return to sport/leisure activities and independence with ADLs/IADLs    Pain  At best pain ratin  At worst pain ratin  Quality: weakness thighs; glut fold pain.  Progression: improved    Social Support    Employment status: working    Diagnostic Tests  X-ray: normal  Treatments  Previous treatment: medication  Current treatment: physical therapy      Objective    POSTURE:  2024 good standing posture  2024 standing posture demonstrates normal lumbar lordosis; there is no lateral shift   Posture correction in stting reduces sympotoms; slumping increases c/o.    SPECIAL TESTS     2024   SLR supine:   (-B/L)   (+ R/- L)  Crossed SLR:   (-B/L)   (- R/+ L)  Prone instability test:  (-)   (+) kashif colin/guarded  Prone hip IR ROM:  (- B/L)   (-R/-L)  Aberrant motion/Xin's: (-)   (+)  Supine to sit test:  (-)   (-)  Sobieski test prone:  (-)   (-)  Slump test:   (- B/L)   (+R/-L)  Femoral NTT:   (- B/L)   (NT)      DERMATOMAL TESTIN/8/2024  L2 anterior thigh:  WNL B/L  L3 medial knee:  WNL B/L  L4 medial maleolus:  WNL B/L  L5 1st web space:  WNL B/L  S1 lateral/sole foot:  WNL B/L  S2 poster calf:  WNL  "B/L    MYOTOMAL TESTIN2024     Right  Left  Right  Left  L2-3 Hip flexion:  5/  5/5  4+/5  4+/5  L3-4 Knee extension:  5/  5/5  5/5  5/5  L5 Great toe exten:  5/  5/5  5/5  5/5  L4 Heel walk/DF:  /  5/5  5/  5/5  S1 toe walk/PF/ever:    5/  5/5  5/5  S2 knee flex:     5/5  5/5  5/5    REFLEXES: 0= none; 1+ = slight; 2+ = brisk/normal; 3+= very brisk; 4+= clonus     2024  L3-4 Quadriceps:  2+ B/L  0  L5-S1 Achilles:  2+ B/L  0    SPINAL/PIAVM ASSESSMENT/PALPATION:   2024: lumbar P to A mobility min colin - no c/o   No TTP B/L QL or gluts  2024: Lumbar P to A segmental mobility kashif limited - guarded     LUMBAR AROM: 2024  Flexion   Min colin   Mod/kashif colin*  Extension  Min/mod colin  kashif colin* B/L glut fold  R sideglide  Min colin   min colin  L sideglide  Min colin   kashif colin* L LE    MECHANICAL ASSESSMENT:   2024: pre-test findings B/L glut fold pain 10  EIL from qped 1x10 = abolish  2024: pre-test findings include 810 pain R LE post thigh/calf and L LE post thigh  ANEL 1 min x3 = cent to mid thigh/B  Repeated extension in lying (REIL) 3x5 = Centralizing to R glut fold/lat hip/B 4/10 (no L LE pain)      FUNCTION:  2024: Pt is limited with tolerance to driving w/ increased pain and LE \"weakness\"   No remaining pain w/ lumbar AROM   Limited tolerance to prolonged standing and walking and sitting - raising from prolonged sitting painful   Limited tolerance to bending/lifting   Inc pain w/ slumped sitting  2024: Pt is limited with tolerance to driving w/ increased pain and LE \"weakness\"   Pain w/ lumbar AROM   Limited tolerance to prolonged standing and walking   Limited tolerance to bending/lifting   Inc pain w/ slumped sitting    FLEXIBILITY:  2024: hams B/L 65 deg   Quads/hip flexors min colin B/L   Pirif mod colin B/L  2024: Hamstring flexibility mod colin B/L 60 deg         Quad/hip flexor flexibitlity NT         " "Pirif flexibility kashif colin B/L          Precautions: History reviewed. No pertinent past medical history.   SOC: 2/29/2024  FOTO: 2/29/2024  POC EXP: 3/8/2024 DA comp spine if POC not signed  DAILY TREATMENT LOG  date 2/8/2024 2/12/2024 2/15/2024 2/22/2024  FOTO 2/29/2024  RE/FOtO   Visit #/auth 1 2 3 4 5   manual                        Neuro Re-Ed  40' 20' 20' 35'   ANEL  1'x3 1'x2 W/ B/L knee flexion 2x10 W/ B/L knee flexion 1x15 W/ B/L knee flexion x15   REIL 3x5 (centralizing/B) 1x10   1x10   EIL from qped  1x10; x2 2x5 1x10 1x10   Sup sciatic NG  1x10 R/L 1x10 R/L 1x10 R/L 1x10 r/L   Sup fig 4 stretch  20\"x3 R/L 20\"x3 R/L 10\"x3 R/L w/ LTR  20\"x3 R/L w/ LTR   Sup pirif stretch w/ SOS pulling bottom knee to chest   20\"x3 R/L 20\"x3 R/L 20\"x3 R/L   Bird dog     5\"x10 ea way   Pt educ Periph vs centraliz; disc mechanics Reviewed disc mechanics; importance of avoidance of prolonged sitting; may need more reps of EIL if he sat a lot; try MH in prone  Reviewed importance of avoidance of sitting/flexed postures and consistency w/ HEP over several consec days    HeP Issued & reviewed Update & review UPDATE & REVIEW  Update & review   Ther Ex   25' 15' 10'   Knee hugs; soldier kicks   15'x4 ea 15'x4 ea    bridges   1x10 W/ hip abd blue 2x10 W/ hip abd blue 5\" 2x10   Fwd step ups w/ alt knee hike   8\" step 1x10 R/L 8\" step 2x10 R/L    Stand open books   1x10 R/L     STS w/ B/L OH press   7# DB's 2x10 7# DB's 2x10 7#DB's 1x10   Rocker board PD ROM   2X10     SL dead lift w/ rail   7# DB 1x10 R/L 7# DB 1x10 R/L 7# DB 1x10 R/L   Cat/cow   1x10  D/c   Ther Activity                        Gait Training                        Modalities        MH/estim prone  10' skin intact pre/post              HEP:  Access Code: [email or web address]  URL: [email or web address]  Date: 02/29/2024  Prepared by: Niru Ireland    Exercises  - Prone on elbows with knee flexion  - 5 x daily - 2 sets - 10 reps  - Quadruped Hip Drops  - 5 x daily - 10 " reps  - Standing Thoracic Open Book at Wall  - 1 x daily - 10 reps  - Supine Piriformis Stretch  - 1 x daily - 3 reps - 20 hold  - Supine Gluteus Stretch  - 1 x daily - 3 reps - 20 hold  - Supine Sciatic Nerve Glide  - 1 x daily - 2 sets - 10 reps  - Bridge with Hip Abduction and Resistance  - 4 x weekly - 2 sets - 10 reps - 5 hold  - Bird Dog  - 4 x weekly - 2 sets - 10 reps - 5 hold  - Single-Leg Hungarian Deadlift With Kettlebell  - 1 x daily - 4 x weekly - 2 sets - 10 reps  - Mini Squat to Shoulder Press with Barbell  - 1 x daily - 4 x weekly - 2 sets - 10 reps